# Patient Record
Sex: MALE | Race: WHITE | Employment: OTHER | URBAN - METROPOLITAN AREA
[De-identification: names, ages, dates, MRNs, and addresses within clinical notes are randomized per-mention and may not be internally consistent; named-entity substitution may affect disease eponyms.]

---

## 2023-12-08 ENCOUNTER — TRANSITIONAL CARE UNIT VISIT (OUTPATIENT)
Dept: GERIATRICS | Facility: CLINIC | Age: 68
End: 2023-12-08

## 2023-12-08 VITALS
RESPIRATION RATE: 16 BRPM | WEIGHT: 150.9 LBS | DIASTOLIC BLOOD PRESSURE: 62 MMHG | OXYGEN SATURATION: 94 % | SYSTOLIC BLOOD PRESSURE: 93 MMHG | HEART RATE: 79 BPM | TEMPERATURE: 97.5 F

## 2023-12-08 DIAGNOSIS — J44.9 CHRONIC OBSTRUCTIVE PULMONARY DISEASE, UNSPECIFIED COPD TYPE (H): ICD-10-CM

## 2023-12-08 DIAGNOSIS — I65.22 LEFT CAROTID STENOSIS: ICD-10-CM

## 2023-12-08 DIAGNOSIS — I10 PRIMARY HYPERTENSION: ICD-10-CM

## 2023-12-08 DIAGNOSIS — I63.81 RIGHT THALAMIC STROKE (H): Primary | ICD-10-CM

## 2023-12-08 DIAGNOSIS — R29.6 FALLS FREQUENTLY: ICD-10-CM

## 2023-12-08 DIAGNOSIS — M62.81 GENERALIZED MUSCLE WEAKNESS: ICD-10-CM

## 2023-12-08 PROCEDURE — 99309 SBSQ NF CARE MODERATE MDM 30: CPT | Performed by: NURSE PRACTITIONER

## 2023-12-08 RX ORDER — TRIAMCINOLONE ACETONIDE 1 MG/G
CREAM TOPICAL DAILY PRN
COMMUNITY

## 2023-12-08 RX ORDER — LANOLIN ALCOHOL/MO/W.PET/CERES
100 CREAM (GRAM) TOPICAL DAILY
COMMUNITY

## 2023-12-08 RX ORDER — CALCIUM CARBONATE 500 MG/1
1 TABLET, CHEWABLE ORAL 2 TIMES DAILY
COMMUNITY

## 2023-12-08 RX ORDER — ACETAMINOPHEN 500 MG
1000 TABLET ORAL 3 TIMES DAILY PRN
COMMUNITY

## 2023-12-08 RX ORDER — AMLODIPINE BESYLATE 10 MG/1
10 TABLET ORAL AT BEDTIME
COMMUNITY

## 2023-12-08 RX ORDER — GABAPENTIN 600 MG/1
600 TABLET ORAL DAILY
COMMUNITY

## 2023-12-08 RX ORDER — FLUTICASONE FUROATE AND VILANTEROL 200; 25 UG/1; UG/1
1 POWDER RESPIRATORY (INHALATION) DAILY
COMMUNITY

## 2023-12-08 RX ORDER — LANOLIN ALCOHOL/MO/W.PET/CERES
3 CREAM (GRAM) TOPICAL
COMMUNITY

## 2023-12-08 RX ORDER — ALBUTEROL SULFATE 90 UG/1
2 AEROSOL, METERED RESPIRATORY (INHALATION) EVERY 6 HOURS PRN
COMMUNITY

## 2023-12-08 RX ORDER — AMOXICILLIN 250 MG
1 CAPSULE ORAL 2 TIMES DAILY
COMMUNITY

## 2023-12-08 RX ORDER — BISACODYL 5 MG
5 TABLET, DELAYED RELEASE (ENTERIC COATED) ORAL DAILY PRN
COMMUNITY

## 2023-12-08 RX ORDER — LIDOCAINE 4 G/G
1 PATCH TOPICAL EVERY 24 HOURS
COMMUNITY

## 2023-12-08 RX ORDER — TAMSULOSIN HYDROCHLORIDE 0.4 MG/1
0.4 CAPSULE ORAL DAILY
COMMUNITY

## 2023-12-08 NOTE — PROGRESS NOTES
[facility] Admission  PCP & CLINIC: No primary care provider on file., No primary physician on file.  Chief Complaint   Patient presents with    Hospital F/U   [place] MRN: [id number]. Place of Service where encounter took place:  [facility] (TCU) [4002] Stephan Kelly  is a 68 year old  (1955), admitted to the above facility from  [facility] . Hospital stay 11/15/23 through 12/5/23. Admitted to this facility for  rehab, medical management, and nursing care. HPI information obtained from: facility chart records, facility staff, patient report, [facility] chart review, and Care Everywhere [facility] chart review.     Brief Summary of Hospital Course: Stephan presented to [facility] on 11/15 w/ falls and BLE weakness x several days. He was found to have a right thalamic parenchymal hemorrhage. He treated nonoperatively, aspirin held x 4 weeks, and BP to stay <140.     In the future, he will need left carotid artery stent.     Updates since admission to transitional care unit: Stephan presented to [facility] on 12/5 s/p the above hospitalization. Today , Stephan is worried about getting back home because he says he has been gone so long that he will have a large stack of bills and mail to go through.  He has a tenant at home that he also helps with daily activities, and says that he needs to get home and help him.  His other support system consists of several cousins, some of which are coming in from out of town to visit him and help him at home.  He adamantly denies vision changes, headaches, numbness or tingling, and says that his left-sided weakness is improving.  He remembers having some right rib pain in the hospital, but this has been going on for weeks.  He otherwise denies shortness of breath, fever, but does have a little bit of a cough with drainage.  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    CODE STATUS/ADVANCE DIRECTIVES DISCUSSION:  CPR/Full code. Patient's living condition: lives alone. ALLERGIES: Amoxicillin PAST MEDICAL HISTORY:  has no past medical history on file.. PAST SURGICAL HISTORY:   has no past surgical history on file.. FAMILY HISTORY: family history is not on file.. SOCIAL HISTORY:     Post Discharge Medication Reconciliation Status: discharge medications reconciled and changed, per note/orders.  Current Outpatient Medications   Medication Sig Dispense Refill    acetaminophen (TYLENOL) 500 MG tablet Take 1,000 mg by mouth 3 times daily as needed for mild pain      albuterol (PROAIR HFA/PROVENTIL HFA/VENTOLIN HFA) 108 (90 Base) MCG/ACT inhaler Inhale 2 puffs into the lungs every 6 hours as needed for shortness of breath, wheezing or cough      amLODIPine (NORVASC) 10 MG tablet Take 10 mg by mouth at bedtime      bisacodyl (DULCOLAX) 5 MG EC tablet Take 5 mg by mouth daily as needed for constipation      calcium carbonate (TUMS) 500 MG chewable tablet Take 1 chew tab by mouth 2 times daily      Cyanocobalamin 1000 MCG CAPS Take 1,000 mcg by mouth daily      fluticasone-vilanterol (BREO ELLIPTA) 200-25 MCG/ACT inhaler Inhale 1 puff into the lungs daily      gabapentin (NEURONTIN) 600 MG tablet Take 600 mg by mouth daily      Lidocaine (LIDOCARE) 4 % Patch Place 1 patch onto the skin every 24 hours To prevent lidocaine toxicity, patient should be patch free for 12 hrs daily.      melatonin 3 MG tablet Take 3 mg by mouth nightly as needed for sleep      senna-docusate (SENOKOT-S/PERICOLACE) 8.6-50 MG tablet Take 1 tablet by mouth 2 times daily      tamsulosin (FLOMAX) 0.4 MG capsule Take 0.4 mg by mouth daily      thiamine (B-1) 100 MG tablet Take 100 mg by mouth daily      tiotropium (SPIRIVA RESPIMAT) 2.5 MCG/ACT inhaler Inhale 2 puffs into the lungs daily      triamcinolone (KENALOG) 0.1 % external cream Apply topically daily as needed for irritation       ROS: Limited secondary to cognitive impairment but today pt reports the above  and 4 point ROS including Respiratory, CV, GI and , other than that noted in the HPI, is negative.    Vitals: BP 93/62   Pulse 79   Temp 97.5  F (36.4  C)   Resp 16   Wt 68.4 kg (150 lb 14.4 oz)   SpO2 94%   Exam:  GENERAL APPEARANCE: Alert, in no distress, cooperative.   ENT: Mouth/posterior oropharynx intact w/ moist mucous membranes, hearing acuity [place].  EYES: EOM, conjunctivae, lids, pupils and irises normal, PERRL2.   RESP: Respiratory effort good, no respiratory distress, Lung sounds clear. On RA.   CV: Auscultation of heart reveals S1, S2, rate and rhythm regular, no murmur, no rub or gallop, Edema 0+ BLE.  ABDOMEN: Normal bowel sounds, soft, non-tender abdomen, and no masses palpated.  SKIN: Inspection/Palpation of skin and subcutaneous tissue baseline w/ fragility. No wounds/rashes noted.   NEURO: CN II-XII at patient's baseline, sensation baseline PPS.  PSYCH: Insight, judgement, and memory are baseline forgetful, affect and mood are happy/engaged.    Lab/Diagnostic data: Recent labs in [facility] reviewed by me today.     ASSESSMENT/PLAN:  Right thalamic stroke (H)  Left carotid stenosis  Falls frequently  Generalized muscle weakness  Primary hypertension  Chronic obstructive pulmonary disease, unspecified COPD type (H)  Acute on chronic. Tenuous.   Provider reviewed records from hospitalization, specialists, headache, and interpreted most recent imaging/blood work, and vital signs.  Noting that aspirin is on hold x 4 weeks.  No headaches or dizziness, but fall risk does persist especially with history.  Will trial Norvasc at at bedtime to mitigate any orthostasis.  Noting B12 supplementation, and we will therefore obtain level as 1 does not appear on record.  Will also trend hemoglobin and BMP given underlying comorbid issues.  Patient is occasionally using melatonin and does not feel like this needs to be on a schedule but wants it to be available.  Will renew this per CMS guidelines place parameter.  Noted initial PRN orders for non antipsychotic psychotropic medications are limited to 14 days. Start new psychotropic medication x 14 days for occasional insomnia.   Goal blood pressure less than 140 systolic outpatient for potential stenting to his left carotid.  Follow up w/in 1 week or as needed.    Orders:  Change Norvasc to HS dosing. Dx: HTN.  Hgb + BMP + B12 level x1 on 12/11. Dx: weakness.  RENEW PRN Melatonin x 14 days. Dx: insomnia.      Electronically signed by:  Dr. Jennifer Ramos, APRN CNP DNP

## 2023-12-08 NOTE — LETTER
12/8/2023        RE: Stephan Kelly  No address on file.        [facility] Admission  PCP & CLINIC: No primary care provider on file., No primary physician on file.  Chief Complaint   Patient presents with     Hospital F/U   [place] MRN: [id number]. Place of Service where encounter took place:  [facility] (TCU) [4002] Stephan Kelly  is a 68 year old  (1955), admitted to the above facility from  [facility] . Hospital stay 11/15/23 through 12/5/23. Admitted to this facility for  rehab, medical management, and nursing care. HPI information obtained from: facility chart records, facility staff, patient report, [facility] chart review, and Care Everywhere [facility] chart review.     Brief Summary of Hospital Course: Stephan presented to [facility] on 11/15 w/ falls and BLE weakness x several days. He was found to have a right thalamic parenchymal hemorrhage. He treated nonoperatively, aspirin held x 4 weeks, and BP to stay <140.     In the future, he will need left carotid artery stent.     Updates since admission to transitional care unit: Stephan presented to TCU on 12/5 s/p the above hospitalization. Today , Stephan is worried about getting back home because he says he has been gone so long that he will have a large stack of bills and mail to go through.  He has a tenant at home that he also helps with daily activities, and says that he needs to get home and help him.  His other support system consists of several cousins, some of which are coming in from out of town to visit him and help him at home.  He adamantly denies vision changes, headaches, numbness or tingling, and says that his left-sided weakness is improving.  He remembers having some right rib pain in the hospital, but this has been going on for weeks.  He otherwise denies shortness of breath, fever, but does have a little bit of a cough with drainage.  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    CODE STATUS/ADVANCE DIRECTIVES DISCUSSION: CPR/Full code. Patient's living condition: lives alone. ALLERGIES: Amoxicillin PAST MEDICAL HISTORY:  has no past medical history on file.. PAST SURGICAL HISTORY:   has no past surgical history on file.. FAMILY HISTORY: family history is not on file.. SOCIAL HISTORY:     Post Discharge Medication Reconciliation Status: discharge medications reconciled and changed, per note/orders.  Current Outpatient Medications   Medication Sig Dispense Refill     acetaminophen (TYLENOL) 500 MG tablet Take 1,000 mg by mouth 3 times daily as needed for mild pain       albuterol (PROAIR HFA/PROVENTIL HFA/VENTOLIN HFA) 108 (90 Base) MCG/ACT inhaler Inhale 2 puffs into the lungs every 6 hours as needed for shortness of breath, wheezing or cough       amLODIPine (NORVASC) 10 MG tablet Take 10 mg by mouth at bedtime       bisacodyl (DULCOLAX) 5 MG EC tablet Take 5 mg by mouth daily as needed for constipation       calcium carbonate (TUMS) 500 MG chewable tablet Take 1 chew tab by mouth 2 times daily       Cyanocobalamin 1000 MCG CAPS Take 1,000 mcg by mouth daily       fluticasone-vilanterol (BREO ELLIPTA) 200-25 MCG/ACT inhaler Inhale 1 puff into the lungs daily       gabapentin (NEURONTIN) 600 MG tablet Take 600 mg by mouth daily       Lidocaine (LIDOCARE) 4 % Patch Place 1 patch onto the skin every 24 hours To prevent lidocaine toxicity, patient should be patch free for 12 hrs daily.       melatonin 3 MG tablet Take 3 mg by mouth nightly as needed for sleep       senna-docusate (SENOKOT-S/PERICOLACE) 8.6-50 MG tablet Take 1 tablet by mouth 2 times daily       tamsulosin (FLOMAX) 0.4 MG capsule Take 0.4 mg by mouth daily       thiamine (B-1) 100 MG tablet Take 100 mg by mouth daily       tiotropium (SPIRIVA RESPIMAT) 2.5 MCG/ACT inhaler Inhale 2 puffs into the lungs daily       triamcinolone (KENALOG) 0.1 % external cream Apply topically daily as needed for irritation        ROS: Limited secondary to cognitive impairment but today pt reports the above and 4 point ROS including Respiratory, CV, GI and , other than that noted in the HPI, is negative.    Vitals: BP 93/62   Pulse 79   Temp 97.5  F (36.4  C)   Resp 16   Wt 68.4 kg (150 lb 14.4 oz)   SpO2 94%   Exam:  GENERAL APPEARANCE: Alert, in no distress, cooperative.   ENT: Mouth/posterior oropharynx intact w/ moist mucous membranes, hearing acuity [place].  EYES: EOM, conjunctivae, lids, pupils and irises normal, PERRL2.   RESP: Respiratory effort good, no respiratory distress, Lung sounds clear. On RA.   CV: Auscultation of heart reveals S1, S2, rate and rhythm regular, no murmur, no rub or gallop, Edema 0+ BLE.  ABDOMEN: Normal bowel sounds, soft, non-tender abdomen, and no masses palpated.  SKIN: Inspection/Palpation of skin and subcutaneous tissue baseline w/ fragility. No wounds/rashes noted.   NEURO: CN II-XII at patient's baseline, sensation baseline PPS.  PSYCH: Insight, judgement, and memory are baseline forgetful, affect and mood are happy/engaged.    Lab/Diagnostic data: Recent labs in [place] reviewed by me today.     ASSESSMENT/PLAN:  Right thalamic stroke (H)  Left carotid stenosis  Falls frequently  Generalized muscle weakness  Primary hypertension  Chronic obstructive pulmonary disease, unspecified COPD type (H)  Acute on chronic. Tenuous.   Provider reviewed records from hospitalization, specialists, headache, and interpreted most recent imaging/blood work, and vital signs.  Noting that aspirin is on hold x 4 weeks.  No headaches or dizziness, but fall risk does persist especially with history.  Will trial Norvasc at at bedtime to mitigate any orthostasis.  Noting B12 supplementation, and we will therefore obtain level as 1 does not appear on record.  Will also trend hemoglobin and BMP given underlying comorbid issues.  Patient is occasionally using melatonin and does not feel like this needs to be on a schedule  but wants it to be available.  Will renew this per CMS guidelines place parameter. Noted initial PRN orders for non antipsychotic psychotropic medications are limited to 14 days. Start new psychotropic medication x 14 days for occasional insomnia.   Goal blood pressure less than 140 systolic outpatient for potential stenting to his left carotid.  Follow up w/in 1 week or as needed.    Orders:  Change Norvasc to HS dosing. Dx: HTN.  Hgb + BMP + B12 level x1 on 12/11. Dx: weakness.  RENEW PRN Melatonin x 14 days. Dx: insomnia.      Electronically signed by:  Dr. Jennifer Ramos, APRN CNP DNP                        Sincerely,        Jennifer Ramos, BERNARDINO CNP

## 2023-12-10 ENCOUNTER — LAB REQUISITION (OUTPATIENT)
Dept: LAB | Facility: CLINIC | Age: 68
End: 2023-12-10

## 2023-12-10 DIAGNOSIS — R53.1 WEAKNESS: ICD-10-CM

## 2023-12-11 LAB
ANION GAP SERPL CALCULATED.3IONS-SCNC: 10 MMOL/L (ref 7–15)
BUN SERPL-MCNC: 14.7 MG/DL (ref 8–23)
CALCIUM SERPL-MCNC: 9.3 MG/DL (ref 8.8–10.2)
CHLORIDE SERPL-SCNC: 103 MMOL/L (ref 98–107)
CREAT SERPL-MCNC: 1.09 MG/DL (ref 0.67–1.17)
DEPRECATED HCO3 PLAS-SCNC: 23 MMOL/L (ref 22–29)
EGFRCR SERPLBLD CKD-EPI 2021: 73 ML/MIN/1.73M2
GLUCOSE SERPL-MCNC: 88 MG/DL (ref 70–99)
HGB BLD-MCNC: 12.5 G/DL (ref 13.3–17.7)
POTASSIUM SERPL-SCNC: 4.2 MMOL/L (ref 3.4–5.3)
SODIUM SERPL-SCNC: 136 MMOL/L (ref 135–145)
VIT B12 SERPL-MCNC: 680 PG/ML (ref 232–1245)

## 2023-12-11 PROCEDURE — P9603 ONE-WAY ALLOW PRORATED MILES: HCPCS | Performed by: FAMILY MEDICINE

## 2023-12-11 PROCEDURE — 36415 COLL VENOUS BLD VENIPUNCTURE: CPT | Performed by: FAMILY MEDICINE

## 2023-12-11 PROCEDURE — 85018 HEMOGLOBIN: CPT | Performed by: FAMILY MEDICINE

## 2023-12-11 PROCEDURE — 80048 BASIC METABOLIC PNL TOTAL CA: CPT | Performed by: FAMILY MEDICINE

## 2023-12-11 PROCEDURE — 82607 VITAMIN B-12: CPT | Performed by: FAMILY MEDICINE

## 2023-12-12 ENCOUNTER — TRANSITIONAL CARE UNIT VISIT (OUTPATIENT)
Dept: GERIATRICS | Facility: CLINIC | Age: 68
End: 2023-12-12

## 2023-12-12 VITALS
RESPIRATION RATE: 16 BRPM | OXYGEN SATURATION: 97 % | HEART RATE: 70 BPM | WEIGHT: 147.2 LBS | HEIGHT: 69 IN | BODY MASS INDEX: 21.8 KG/M2 | SYSTOLIC BLOOD PRESSURE: 133 MMHG | DIASTOLIC BLOOD PRESSURE: 73 MMHG | TEMPERATURE: 98 F

## 2023-12-12 DIAGNOSIS — I10 PRIMARY HYPERTENSION: ICD-10-CM

## 2023-12-12 DIAGNOSIS — I63.81 RIGHT THALAMIC STROKE (H): Primary | ICD-10-CM

## 2023-12-12 DIAGNOSIS — J44.9 CHRONIC OBSTRUCTIVE PULMONARY DISEASE, UNSPECIFIED COPD TYPE (H): ICD-10-CM

## 2023-12-12 DIAGNOSIS — R29.6 FALLS FREQUENTLY: ICD-10-CM

## 2023-12-12 DIAGNOSIS — M62.81 GENERALIZED MUSCLE WEAKNESS: ICD-10-CM

## 2023-12-12 DIAGNOSIS — I65.22 LEFT CAROTID STENOSIS: ICD-10-CM

## 2023-12-12 PROBLEM — J98.6 ELEVATED DIAPHRAGM: Status: ACTIVE | Noted: 2023-12-12

## 2023-12-12 PROBLEM — I25.10 CALCIFICATION OF CORONARY ARTERY: Status: ACTIVE | Noted: 2023-12-12

## 2023-12-12 PROBLEM — J43.9 PULMONARY EMPHYSEMA (H): Status: ACTIVE | Noted: 2023-12-12

## 2023-12-12 PROBLEM — Z87.01 HISTORY OF PNEUMONIA: Status: ACTIVE | Noted: 2023-12-12

## 2023-12-12 PROBLEM — Z87.09 HISTORY OF PLEURAL EFFUSION: Status: ACTIVE | Noted: 2023-12-12

## 2023-12-12 PROBLEM — I65.29 CAROTID ARTERY OCCLUSION: Status: ACTIVE | Noted: 2023-12-12

## 2023-12-12 PROCEDURE — 99316 NF DSCHRG MGMT 30 MIN+: CPT | Performed by: NURSE PRACTITIONER

## 2023-12-12 NOTE — PROGRESS NOTES
[facility] TCU DISCHARGE SUMMARY  PATIENT'S NAME: Stephan Kelly : 1955 MRN: [id number] Place of Service where encounter took place:  [facility] (TCU) [4002] PRIMARY CARE PROVIDER AND CLINIC RESPONSIBLE AFTER TRANSFER:  Non-[facility] Provider     Transferring providers: Dr. Jennifer Ramos, APRN CNP DNP.  Recent Hospitalization/ED: [facility] stay 11/15 to .  Date of TCU Admission: 23.  Date of TCU (anticipated) Discharge: w/in 2 weeks.  Discharged to: previous independent home  Cognitive Scores: SLUMS   Physical Function:  GARCÍA 43/56, TUG 21 sec, fww 350 ft and 100 ft with mod I with occ LLE foot drag.  DME: None.  CODE STATUS/ADVANCE DIRECTIVES DISCUSSION:  Full Code.  ALLERGIES: Amoxicillin    DISCHARGE DIAGNOSIS/NURSING FACILITY COURSE:   Right thalamic stroke (H)  Left carotid stenosis  Falls frequently  Generalized muscle weakness  Primary hypertension  Chronic obstructive pulmonary disease, unspecified COPD type (H)    Hospitalization:  Stephan presented to [facility] on 11/15 w/ falls and BLE weakness x several days. He was found to have a right thalamic parenchymal hemorrhage. He treated nonoperatively, aspirin held x 4 weeks, and BP to stay <140.     Rehab: Stephan presented to TCU on  s/p the above hospitalization. Norvasc was switched to HS to try and mitigate fall risk and we also noted some soft BPs, labs were trended, and Stephan excelled in rehab with a quick turnaround to high functioning. His goal was to be able to return to work at [employer].     Today, Rehab states Stephan just needs to work on stairs, but he is otherwise close to discharge. Stephan agrees that his improvements are positive and he further denies any other developments. No new cough/fever, SOB, CP, palpitations, constipation/diarrhea. The team anticipates discharge back to home in the next 1-2 weeks before holiday.     Recommendations to PCP:  In the future, he will need left carotid artery  "stent.   Do not drive until cleared by neurology, PCP team, and rehab.     Past Medical History:  has no past medical history on file.  Discharge Medications:  Current Outpatient Medications   Medication Sig Dispense Refill    acetaminophen (TYLENOL) 500 MG tablet Take 1,000 mg by mouth 3 times daily as needed for mild pain      albuterol (PROAIR HFA/PROVENTIL HFA/VENTOLIN HFA) 108 (90 Base) MCG/ACT inhaler Inhale 2 puffs into the lungs every 6 hours as needed for shortness of breath, wheezing or cough      amLODIPine (NORVASC) 10 MG tablet Take 10 mg by mouth at bedtime      bisacodyl (DULCOLAX) 5 MG EC tablet Take 5 mg by mouth daily as needed for constipation      calcium carbonate (TUMS) 500 MG chewable tablet Take 1 chew tab by mouth 2 times daily      Cyanocobalamin 1000 MCG CAPS Take 1,000 mcg by mouth daily      fluticasone-vilanterol (BREO ELLIPTA) 200-25 MCG/ACT inhaler Inhale 1 puff into the lungs daily      gabapentin (NEURONTIN) 600 MG tablet Take 600 mg by mouth daily      Lidocaine (LIDOCARE) 4 % Patch Place 1 patch onto the skin every 24 hours To prevent lidocaine toxicity, patient should be patch free for 12 hrs daily.      melatonin 3 MG tablet Take 3 mg by mouth nightly as needed for sleep      senna-docusate (SENOKOT-S/PERICOLACE) 8.6-50 MG tablet Take 1 tablet by mouth 2 times daily      tamsulosin (FLOMAX) 0.4 MG capsule Take 0.4 mg by mouth daily      thiamine (B-1) 100 MG tablet Take 100 mg by mouth daily      tiotropium (SPIRIVA RESPIMAT) 2.5 MCG/ACT inhaler Inhale 2 puffs into the lungs daily      triamcinolone (KENALOG) 0.1 % external cream Apply topically daily as needed for irritation       ROS: 4 point ROS including Respiratory, CV, GI and , other than that noted in the HPI, is negative.    Physical Exam: Vitals: /73   Pulse 70   Temp 98  F (36.7  C)   Resp 16   Ht 1.753 m (5' 9\")   Wt 66.8 kg (147 lb 3.2 oz)   SpO2 97%   BMI 21.74 kg/m    GENERAL APPEARANCE: Alert, in no " distress, cooperative.   ENT: Mouth/posterior oropharynx intact w/ moist mucous membranes, hearing acuity [place].  EYES: EOM, conjunctivae, lids, pupils and irises normal, PERRL2.   RESP: Respiratory effort good, no respiratory distress, On RA.   CV: Edema 0+ BLE.  SKIN: Inspection/Palpation of skin and subcutaneous tissue baseline w/ fragility. No wounds/rashes noted.   PSYCH: Insight, judgement, and memory are baseline, affect and mood are happy/engaged.    Facility Labs: Labs done in SNF are in [place] EPIC. Please refer to them using EPIC/Care Everywhere.    DISCHARGE PLAN:  Follow up labs: No labs orders/due  Medical Follow Up:  Follow up with primary care provider in 1-4 weeks  MTM referral needed and placed by this provider: Yes: per TCU discharge protocol.  Current [place] scheduled appointments:  None.  Discharge Services: Home Care:  Occupational Therapy, Physical Therapy, Registered Nurse, and Home Health Aide    Orders:  MTM referral x1, routine. Dx: TCU discharge.     TOTAL DISCHARGE TIME:   Greater than 30 minutes    Electronically signed by:  Dr. Jennifer Ramos, APRN CNP DNP  ______________      Documentation of Face-to-Face and Certification for Home Health Services   Patient: Stephan Kelly YOB: 1955  MRN: [id number]  Today's Date: 12/12/2023  I certify that patient: Stephan Kelly is under my care and that I had a face-to-face encounter that meets the provider  face-to-face encounter requirements with this patient on: 12/12/2023. This encounter with the patient was in whole, or in part, for the following medical condition, which is the primary reason for home health care: CVA. I certify that, based on my findings, the following services are medically necessary home health services: Nursing, Occupational Therapy, and Physical Therapy. My clinical findings support the need for the above services because: Nurse is needed: To provide assessment and oversight required in the home  to assure adherence to the medical plan due to: CVA.., Occupational Therapy Services are needed to assess and treat cognitive ability and address ADL safety due to impairment in mobility., and Physical Therapy Services are needed to assess and treat the following functional impairments: mobility.    Further, I certify that my clinical findings support that this patient is homebound (i.e. absences from home require considerable and taxing effort and are for medical reasons or Hindu services or infrequently or of short duration when for other reasons) because: Requires assistance of another person or specialized equipment to access medical services because patient: Range of motion limitations prevents ability to exit home safely...    Based on the above findings. I certify that this patient is confined to the home and needs intermittent skilled nursing care, physical therapy and/or speech therapy.  The patient is under my care, and I have initiated the establishment of the plan of care.  This patient will be followed by a provider who will periodically review the plan of care.    Provider to give follow up care: [facility]    Responsible Medicare certified PECOS Provider: BERNARDINO Hyatt CNP DNP  Provider Signature: See electronic signature associated with these discharge orders.  Date: 12/12/2023

## 2023-12-12 NOTE — LETTER
2023        RE: Stephan Kelly  [address]  [facility] [address]        [facility] DISCHARGE SUMMARY  PATIENT'S NAME: Stephan Kelly : 1955 MRN: [id number] Place of Service where encounter took place:  [facility] (TCU) [[id number]] PRIMARY CARE PROVIDER AND CLINIC RESPONSIBLE AFTER TRANSFER:  Non-[facility] Provider     Transferring providers: Dr. Jennifer Ramos, APRN CNP DNP.  Recent Hospitalization/ED: [facility] stay 11/15 to .  Date of TCU Admission: 23.  Date of TCU (anticipated) Discharge: w/in 2 weeks.  Discharged to: previous independent home  Cognitive Scores: SLUMS 28/30  Physical Function:  GARCÍA 43/56, TUG 21 sec, fww 350 ft and 100 ft with mod I with occ LLE foot drag.  DME: None.  CODE STATUS/ADVANCE DIRECTIVES DISCUSSION:  Full Code.  ALLERGIES: Amoxicillin    DISCHARGE DIAGNOSIS/NURSING FACILITY COURSE:   Right thalamic stroke (H)  Left carotid stenosis  Falls frequently  Generalized muscle weakness  Primary hypertension  Chronic obstructive pulmonary disease, unspecified COPD type (H)    Hospitalization:  Stephan presented to [facility] on 11/15 w/ falls and BLE weakness x several days. He was found to have a right thalamic parenchymal hemorrhage. He treated nonoperatively, aspirin held x 4 weeks, and BP to stay <140.     Rehab: Stephan presented to TCU on  s/p the above hospitalization. Norvasc was switched to HS to try and mitigate fall risk and we also noted some soft BPs, labs were trended, and Stephan excelled in rehab with a quick turnaround to high functioning. His goal was to be able to return to work at [employer].     Today, Rehab states Stephan just needs to work on stairs, but he is otherwise close to discharge. Stephan agrees that his improvements are positive and he further denies any other developments. No new cough/fever, SOB, CP, palpitations, constipation/diarrhea. The team anticipates discharge back to home in the next 1-2 weeks  before holiday.     Recommendations to PCP:  In the future, he will need left carotid artery stent.   Do not drive until cleared by neurology, PCP team, and rehab.     Past Medical History:  has no past medical history on file.  Discharge Medications:  Current Outpatient Medications   Medication Sig Dispense Refill     acetaminophen (TYLENOL) 500 MG tablet Take 1,000 mg by mouth 3 times daily as needed for mild pain       albuterol (PROAIR HFA/PROVENTIL HFA/VENTOLIN HFA) 108 (90 Base) MCG/ACT inhaler Inhale 2 puffs into the lungs every 6 hours as needed for shortness of breath, wheezing or cough       amLODIPine (NORVASC) 10 MG tablet Take 10 mg by mouth at bedtime       bisacodyl (DULCOLAX) 5 MG EC tablet Take 5 mg by mouth daily as needed for constipation       calcium carbonate (TUMS) 500 MG chewable tablet Take 1 chew tab by mouth 2 times daily       Cyanocobalamin 1000 MCG CAPS Take 1,000 mcg by mouth daily       fluticasone-vilanterol (BREO ELLIPTA) 200-25 MCG/ACT inhaler Inhale 1 puff into the lungs daily       gabapentin (NEURONTIN) 600 MG tablet Take 600 mg by mouth daily       Lidocaine (LIDOCARE) 4 % Patch Place 1 patch onto the skin every 24 hours To prevent lidocaine toxicity, patient should be patch free for 12 hrs daily.       melatonin 3 MG tablet Take 3 mg by mouth nightly as needed for sleep       senna-docusate (SENOKOT-S/PERICOLACE) 8.6-50 MG tablet Take 1 tablet by mouth 2 times daily       tamsulosin (FLOMAX) 0.4 MG capsule Take 0.4 mg by mouth daily       thiamine (B-1) 100 MG tablet Take 100 mg by mouth daily       tiotropium (SPIRIVA RESPIMAT) 2.5 MCG/ACT inhaler Inhale 2 puffs into the lungs daily       triamcinolone (KENALOG) 0.1 % external cream Apply topically daily as needed for irritation       ROS: 4 point ROS including Respiratory, CV, GI and , other than that noted in the HPI, is negative.    Physical Exam: Vitals: /73   Pulse 70   Temp 98  F (36.7  C)   Resp 16   Ht  "1.753 m (5' 9\")   Wt 66.8 kg (147 lb 3.2 oz)   SpO2 97%   BMI 21.74 kg/m    GENERAL APPEARANCE: Alert, in no distress, cooperative.   ENT: Mouth/posterior oropharynx intact w/ moist mucous membranes, hearing acuity [place].  EYES: EOM, conjunctivae, lids, pupils and irises normal, PERRL2.   RESP: Respiratory effort good, no respiratory distress, On RA.   CV: Edema 0+ BLE.  SKIN: Inspection/Palpation of skin and subcutaneous tissue baseline w/ fragility. No wounds/rashes noted.   PSYCH: Insight, judgement, and memory are baseline, affect and mood are happy/engaged.    Facility Labs: Labs done in SNF are in [place] [facility]. Please refer to them using [facility] Everywhere.    DISCHARGE PLAN:  Follow up labs: No labs orders/due  Medical Follow Up:  Follow up with primary care provider in 1-4 weeks  MTM referral needed and placed by this provider: Yes: per TCU discharge protocol.  Current [place] scheduled appointments:  None.  Discharge Services: Home Care:  Occupational Therapy, Physical Therapy, Registered Nurse, and Home Health Aide    Orders:  MTM referral x1, routine. Dx: TCU discharge.     TOTAL DISCHARGE TIME:   Greater than 30 minutes    Electronically signed by:  Dr. Jennifer Ramos, APRN CNP DNP  ______________      Documentation of Face-to-Face and Certification for Home Health Services   Patient: Stephan Kelly YOB: 1955  MRN: [id number]  Today's Date: 12/12/2023  I certify that patient: Stephan Kelly is under my care and that I had a face-to-face encounter that meets the provider  face-to-face encounter requirements with this patient on: 12/12/2023. This encounter with the patient was in whole, or in part, for the following medical condition, which is the primary reason for home health care: CVA. I certify that, based on my findings, the following services are medically necessary home health services: Nursing, Occupational Therapy, and Physical Therapy. My clinical findings support " the need for the above services because: Nurse is needed: To provide assessment and oversight required in the home to assure adherence to the medical plan due to: CVA.., Occupational Therapy Services are needed to assess and treat cognitive ability and address ADL safety due to impairment in mobility., and Physical Therapy Services are needed to assess and treat the following functional impairments: mobility.    Further, I certify that my clinical findings support that this patient is homebound (i.e. absences from home require considerable and taxing effort and are for medical reasons or [employer] or infrequently or of short duration when for other reasons) because: Requires assistance of another person or specialized equipment to access medical services because patient: Range of motion limitations prevents ability to exit home safely...    Based on the above findings. I certify that this patient is confined to the home and needs intermittent skilled nursing care, physical therapy and/or speech therapy.  The patient is under my care, and I have initiated the establishment of the plan of care.  This patient will be followed by a provider who will periodically review the plan of care.    Provider to give follow up care: [facility]    Responsible Medicare certified P[employer] Provider: BERNARDINO Hyatt CNP [facility]  Provider Signature: See electronic signature associated with these discharge orders.  Date: 12/12/2023                 Sincerely,        BERNARDINO Covington CNP